# Patient Record
Sex: MALE | Race: BLACK OR AFRICAN AMERICAN | NOT HISPANIC OR LATINO | URBAN - METROPOLITAN AREA
[De-identification: names, ages, dates, MRNs, and addresses within clinical notes are randomized per-mention and may not be internally consistent; named-entity substitution may affect disease eponyms.]

---

## 2024-12-06 ENCOUNTER — OFFICE VISIT (OUTPATIENT)
Dept: URGENT CARE | Age: 34
End: 2024-12-06
Payer: COMMERCIAL

## 2024-12-06 VITALS
WEIGHT: 200 LBS | SYSTOLIC BLOOD PRESSURE: 123 MMHG | DIASTOLIC BLOOD PRESSURE: 92 MMHG | OXYGEN SATURATION: 96 % | TEMPERATURE: 99.2 F | RESPIRATION RATE: 16 BRPM | HEART RATE: 84 BPM

## 2024-12-06 DIAGNOSIS — J40 BRONCHITIS: Primary | ICD-10-CM

## 2024-12-06 RX ORDER — DOXYCYCLINE 100 MG/1
100 CAPSULE ORAL 2 TIMES DAILY
Qty: 14 CAPSULE | Refills: 0 | Status: SHIPPED | OUTPATIENT
Start: 2024-12-06 | End: 2024-12-13

## 2024-12-06 RX ORDER — LEVETIRACETAM 750 MG/1
750 TABLET ORAL
COMMUNITY

## 2024-12-06 NOTE — PROGRESS NOTES
Subjective   Patient ID: Saravanan Saleh is a 34 y.o. male. They present today with a chief complaint of URI (Sinus pressure, head pressure, and cough times 2 weeks, worse over the past 5 days.).    History of Present Illness  35 yo male coming in for sinus pressure and sinus headache. He states he has also been coughing for over 2 weeks. He states the cough has gotten worse over the last 5 days. He states he has had some chills but no fever. He denies any shortness of breath.     Past Medical History  Allergies as of 12/06/2024 - Reviewed 12/06/2024   Allergen Reaction Noted    Penicillins Hives 12/06/2024       (Not in a hospital admission)       No past medical history on file.    No past surgical history on file.         Review of Systems  Review of Systems:  General: No weight loss, fatigue, anorexia, insomnia, fever, chills.  ENT: No pharyngitis, dry mouth, nasal congestion, ear pain. Positive sinus pressure  Cardiac: No chest pain, palpitations, syncope, near syncope.  Pulmonary:  No shortness of breath, positive cough, no hemoptysis  Heme/lymph: No swollen glands, fever, bleeding  Musculoskeletal: No limb pain, joint pain, joint swelling.  Skin: No rashes  Neuro: No numbness, tingling, positive headaches                                 Objective    Vitals:    12/06/24 1648   BP: (!) 123/92   Pulse: 84   Resp: 16   Temp: 37.3 °C (99.2 °F)   SpO2: 96%   Weight: 90.7 kg (200 lb)     No LMP for male patient.    Physical Exam  Physical Exam:  General: Vital noted, no distress. Afebrile  EENT: Eyes unremarkable, Pupils PERRLA, EOMs intact. Left TM unremarkable.  Right TM with mild erythema. Posterior oropharynx with erythema. Uvula in the midline and non-edematous. No PTA. No retropharyngeal mass. No Nikko's angina.  Neck: Supple. No meningismus through full range of motion. No lymphadenopathy.  Cardiac: Regular rate and rhythm, no murmur  Pulmonary: Lungs clear bilaterally with good aeration. No adventitious  breath sounds.    Procedures    Point of Care Test & Imaging Results from this visit  No results found for this visit on 12/06/24.   No results found.    Diagnostic study results (if any) were reviewed by YAA Hernandez.    Assessment/Plan   Allergies, medications, history, and pertinent labs/EKGs/Imaging reviewed by YAA Hernandez.     Medical Decision Making  Treatment: Doxycycline prescribed  Differential: 1) uri , 2) bronchitis, 3) pneumonia  Plan: Patient will follow up with the PCP in the next 2-3 days. Return for any worsening symptoms or go to the ER for further evaluation. Patient understands return precautions and discharge insturctions.  Impression:   1)bronchitis      Orders and Diagnoses  Diagnoses and all orders for this visit:  Bronchitis  -     doxycycline (Vibramycin) 100 mg capsule; Take 1 capsule (100 mg) by mouth 2 times a day for 7 days. Take with at least 8 ounces (large glass) of water, do not lie down for 30 minutes after      Medical Admin Record      Patient disposition: Home    Electronically signed by YAA Hernandez  4:58 PM

## 2025-05-12 ENCOUNTER — OFFICE VISIT (OUTPATIENT)
Dept: URGENT CARE | Age: 35
End: 2025-05-12
Payer: COMMERCIAL

## 2025-05-12 VITALS
HEART RATE: 82 BPM | OXYGEN SATURATION: 95 % | DIASTOLIC BLOOD PRESSURE: 95 MMHG | SYSTOLIC BLOOD PRESSURE: 137 MMHG | TEMPERATURE: 98.7 F

## 2025-05-12 DIAGNOSIS — J10.1 INFLUENZA B: ICD-10-CM

## 2025-05-12 DIAGNOSIS — R09.81 NASAL CONGESTION: Primary | ICD-10-CM

## 2025-05-12 LAB
POC CORONAVIRUS SARS-COV-2 PCR: NEGATIVE
POC HUMAN RHINOVIRUS PCR: NEGATIVE
POC INFLUENZA A VIRUS PCR: NEGATIVE
POC INFLUENZA B VIRUS PCR: POSITIVE
POC RESPIRATORY SYNCYTIAL VIRUS PCR: NEGATIVE

## 2025-05-12 PROCEDURE — 99213 OFFICE O/P EST LOW 20 MIN: CPT | Performed by: NURSE PRACTITIONER

## 2025-05-12 PROCEDURE — 87631 RESP VIRUS 3-5 TARGETS: CPT | Performed by: NURSE PRACTITIONER

## 2025-05-12 NOTE — PROGRESS NOTES
Subjective   Patient ID: Saravanan Saleh is a 34 y.o. male. They present today with a chief complaint of Fever, Earache, Nasal Congestion, Cough, and Headache (Left ear , all symptoms started 5/9/2025).    History of Present Illness  35 yo male coming in for fever, ear pain, congestion, headache, and cough. He states this has been going on since Friday. He denies any body aching. He denies any other complaints today.     Past Medical History  Allergies as of 05/12/2025 - Reviewed 05/12/2025   Allergen Reaction Noted    Penicillins Hives 12/06/2024    Amoxicillin Hives and Itching 03/17/2014       Prescriptions Prior to Admission[1]     Medical History[2]    Surgical History[3]         Review of Systems  Review of Systems:  General: No weight loss, fatigue, anorexia, insomnia, positive fever, chills.  ENT: Positive pharyngitis, no dry mouth, positive nasal congestion, left ear pain  Cardiac: No chest pain, palpitations, syncope, near syncope.  Pulmonary:  No shortness of breath, cough, hemoptysis  Heme/lymph: No swollen glands, fever, bleeding  Musculoskeletal: No limb pain, joint pain, joint swelling.  Skin: No rashes  Neuro: No numbness, tingling, positive headaches                                 Objective    Vitals:    05/12/25 1026   BP: (!) 137/95   Pulse: 82   Temp: 37.1 °C (98.7 °F)   SpO2: 95%     No LMP for male patient.    Physical Exam  Physical Exam:  General: Vital noted, no distress. Afebrile  EENT: Eyes unremarkable, Pupils PERRLA, EOMs intact. TMs unremarkable. Posterior oropharynx with mild erythema. Uvula in the midline and non-edematous. No PTA. No retropharyngeal mass. No Nikko's angina.  Cardiac: Regular rate and rhythm, no murmur  Pulmonary: Lungs clear bilaterally with good aeration. No adventitious breath sounds.  Skin: No rashes    Procedures    Point of Care Test & Imaging Results from this visit  Results for orders placed or performed in visit on 05/12/25   POCT SPOTFIRE R/ST Panel Mini  w/COVID (Lang-8) manually resulted    Specimen: Swab   Result Value Ref Range    POC Sars-Cov-2 PCR Negative Negative    POC Respiratory Syncytial Virus PCR Negative Negative    POC Influenza A Virus PCR Negative Negative    POC Influenza B Virus PCR Positive (A) Negative    POC Human Rhinovirus PCR Negative Negative      Imaging  No results found.    Cardiology, Vascular, and Other Imaging  No other imaging results found for the past 2 days      Diagnostic study results (if any) were reviewed by YAA Hernandez.    Assessment/Plan   Allergies, medications, history, and pertinent labs/EKGs/Imaging reviewed by YAA Hernandez.     Medical Decision Making  Testing: [facility]fire  Treatment: Advised OTC medications for symptom relief  Differential: 1) rhinovirus, 2) Flu B , 3) sinusitis  Plan: Patient will follow up with the PCP as needed. Return for any worsening symptoms or go to the ER for further evaluation. Patient understands return precautions and discharge insturctions.  Impression:   1) flu B      Orders and Diagnoses  Diagnoses and all orders for this visit:  Nasal congestion  -     POCT SPOTFIRE R/ST Panel Mini w/COVID (Lang-8) manually resulted  Influenza B      Medical Admin Record      Patient disposition: Home    Electronically signed by YAA Hernandez  11:01 AM           [1] (Not in a hospital admission)   [2] History reviewed. No pertinent past medical history.  [3] History reviewed. No pertinent surgical history.